# Patient Record
Sex: FEMALE | Race: WHITE | NOT HISPANIC OR LATINO | Employment: FULL TIME | URBAN - METROPOLITAN AREA
[De-identification: names, ages, dates, MRNs, and addresses within clinical notes are randomized per-mention and may not be internally consistent; named-entity substitution may affect disease eponyms.]

---

## 2017-02-14 ENCOUNTER — ALLSCRIPTS OFFICE VISIT (OUTPATIENT)
Dept: OTHER | Facility: OTHER | Age: 32
End: 2017-02-14

## 2017-02-14 ENCOUNTER — TRANSCRIBE ORDERS (OUTPATIENT)
Dept: ADMINISTRATIVE | Facility: HOSPITAL | Age: 32
End: 2017-02-14

## 2017-02-14 DIAGNOSIS — N64.4 MASTODYNIA: ICD-10-CM

## 2017-02-14 DIAGNOSIS — N64.4 MASTODYNIA: Primary | ICD-10-CM

## 2017-02-16 ENCOUNTER — TRANSCRIBE ORDERS (OUTPATIENT)
Dept: ADMINISTRATIVE | Facility: HOSPITAL | Age: 32
End: 2017-02-16

## 2017-02-16 ENCOUNTER — HOSPITAL ENCOUNTER (OUTPATIENT)
Dept: MAMMOGRAPHY | Facility: CLINIC | Age: 32
Discharge: HOME | End: 2017-02-16
Payer: COMMERCIAL

## 2017-02-16 ENCOUNTER — GENERIC CONVERSION - ENCOUNTER (OUTPATIENT)
Dept: OTHER | Facility: OTHER | Age: 32
End: 2017-02-16

## 2017-02-16 ENCOUNTER — HOSPITAL ENCOUNTER (OUTPATIENT)
Dept: ULTRASOUND IMAGING | Facility: CLINIC | Age: 32
Discharge: HOME | End: 2017-02-16
Payer: COMMERCIAL

## 2017-02-16 DIAGNOSIS — N64.4 MASTODYNIA: ICD-10-CM

## 2017-02-16 DIAGNOSIS — Z09 FOLLOW-UP EXAM, 3-6 MONTHS SINCE PREVIOUS EXAM: Primary | ICD-10-CM

## 2017-02-16 PROCEDURE — G0206 DX MAMMO INCL CAD UNI: HCPCS

## 2017-02-16 PROCEDURE — 76642 ULTRASOUND BREAST LIMITED: CPT

## 2017-03-01 ENCOUNTER — ALLSCRIPTS OFFICE VISIT (OUTPATIENT)
Dept: OTHER | Facility: OTHER | Age: 32
End: 2017-03-01

## 2017-07-17 ENCOUNTER — ALLSCRIPTS OFFICE VISIT (OUTPATIENT)
Dept: OTHER | Facility: OTHER | Age: 32
End: 2017-07-17

## 2017-08-16 DIAGNOSIS — N64.4 MASTODYNIA: ICD-10-CM

## 2018-01-12 VITALS
DIASTOLIC BLOOD PRESSURE: 78 MMHG | HEART RATE: 88 BPM | TEMPERATURE: 98.4 F | OXYGEN SATURATION: 98 % | WEIGHT: 167.38 LBS | BODY MASS INDEX: 28.57 KG/M2 | SYSTOLIC BLOOD PRESSURE: 124 MMHG | RESPIRATION RATE: 14 BRPM | HEIGHT: 64 IN

## 2018-01-13 VITALS
WEIGHT: 165.38 LBS | BODY MASS INDEX: 28.24 KG/M2 | HEIGHT: 64 IN | DIASTOLIC BLOOD PRESSURE: 66 MMHG | SYSTOLIC BLOOD PRESSURE: 120 MMHG

## 2018-01-13 VITALS
HEART RATE: 105 BPM | SYSTOLIC BLOOD PRESSURE: 122 MMHG | DIASTOLIC BLOOD PRESSURE: 84 MMHG | HEIGHT: 64 IN | BODY MASS INDEX: 28.92 KG/M2 | TEMPERATURE: 98.7 F | OXYGEN SATURATION: 98 % | WEIGHT: 169.38 LBS | RESPIRATION RATE: 15 BRPM

## 2018-01-13 NOTE — RESULT NOTES
Message   Left breast mammogram and us shows benign findings and radiology rec  Follow-up diagnostic mammogram of the left breast in 6 months  No specific findings to account for breast pain  Small left    breast density felt to most likely be summation shadow without    ultrasound correlate  Six-month follow-up mammogram suggested to    ensure stability as this is a baseline exam       Rec  f-up with breast specialist at [place] [address] as directed for opinion re the mammogram and US findings and clinical management  She should get a 6 month f-up mammogram also as directed by radiology to ensure stablity  make sure she f-up with breast care specialist at [place] [address] as directed for opinion re hx of left breast pain, also 6 month f-up mammogram as per Radiology to ensure stability  Overall probably benign findings on mammogram and US left breast      Verified Results  *US BREAST LEFT LIMITED (DIAGNOSTIC) [id number] 08:11AM [facility]   [facility] Order Number: [id number]    - Patient Instructions: To schedule this appointment, please contact Central Scheduling at [phone]  Test Name Result Flag Reference   US BREAST LEFT LIMITED (Report)     Patient History:   No known family history of cancer  Patient has never smoked  Patient's BMI is 28 3  Reason for exam: clinical finding  Mammo Diagnostic Left W CAD: February 16, 2017 - Check In #:    [de-identified]   CC, MLO, and ML view(s) were taken of the left breast      Technologist: JUNITO Zelaya (R)(M)   There are scattered fibroglandular densities  There are no    dominant or spiculated masses seen within the breast  There are    a few benign-appearing calcifications in the posterior inner left   breast  There are no malignant appearing microcalcification    clusters  In the posterior slightly outer breast on the cc view    there is a small focal density which is felt to most likely be a    summation density   No definitive correlate identified on the MLO   or lateral images  There is no architectural distortion    identified  The skin and nipple profile are normal  There is no   axillary adenopathy  An ultrasound of the left breast was performed  Images were    obtained from 1:00 to 5:00 locations from the nipple to the    periphery  No solid or cystic mass lesions were seen  No    abnormal shadowing tissue identified  No skin thickening  US Breast Left Limited: February 16, 2017 - Check In #: [de-identified]   Standard views  Technologist: Mauro Bacon RDMS   A uniformly echogenic layer of fibroglandular tissue  See above      See above     ACR BI-RADS® Assessments: [facility]:3 - Probably Benign (Overall)   Left breast Left Diag Mammo: [facility]:3 - probably benign finding in   the left breast    Left breast Left Brst US: Left breast is [facility]:1 - negative  Recommendation:   Clinical management of the left breast    Follow-up diagnostic mammogram of the left breast in 6 months  No specific findings to account for breast pain  Small left    breast density felt to most likely be summation shadow without    ultrasound correlate  Six-month follow-up mammogram suggested to   ensure stability as this is a baseline exam    Analyzed by CAD     Transcription Location: [address]   Signing Station: [id number]     Risk Value(s):   Tyrer-Cuzick 10 Year: 0 700%, Tyrer-Cuzick Lifetime: 14 800%,    Myriad Table: 1 5%   Signed by:   Jayashree Carrero MD   2/16/17     MAMMO DIAGNOSTIC LEFT W CAD [id number] 08:10AM [facility] Order Number: [id number]    - Patient Instructions: To schedule this appointment, please contact Central Scheduling at [phone]  Do not wear any perfume, powder, lotion or deodorant on breast or underarm area  Please bring your doctors order, referral (if needed) and insurance information with you on the day of the test  Failure to bring this information may result in this test being rescheduled   Arrive 15 minutes prior to your appointment time to register  On the day of your test, please bring any prior mammogram or breast studies with you that were not performed at a [facility]  Failure to bring prior exams may result in your test needing to be rescheduled  Test Name Result Flag Reference   MAMMO DIAGNOSTIC LEFT W CAD (Report)     Patient History:   No known family history of cancer  Patient has never smoked  Patient's BMI is 28 3  Reason for exam: clinical finding  Mammo Diagnostic Left W CAD: February 16, 2017 - Check In #:    [de-identified]   CC, MLO, and ML view(s) were taken of the left breast      Technologist: JUNITO Alexander (R)(M)   There are scattered fibroglandular densities  There are no    dominant or spiculated masses seen within the breast  There are    a few benign-appearing calcifications in the posterior inner left   breast  There are no malignant appearing microcalcification    clusters  In the posterior slightly outer breast on the cc view    there is a small focal density which is felt to most likely be a    summation density  No definitive correlate identified on the MLO   or lateral images  There is no architectural distortion    identified  The skin and nipple profile are normal  There is no   axillary adenopathy  An ultrasound of the left breast was performed  Images were    obtained from 1:00 to 5:00 locations from the nipple to the    periphery  No solid or cystic mass lesions were seen  No    abnormal shadowing tissue identified  No skin thickening  US Breast Left Limited: February 16, 2017 - Check In #: [de-identified]   Standard views  Technologist: Tracy Balderas RDMS   A uniformly echogenic layer of fibroglandular tissue  See above      See above     ACR BI-RADS® Assessments: [facility]:3 - Probably Benign (Overall)   Left breast Left Diag Mammo: Bi[facility]:3 - probably benign finding in   the left breast    Left breast Left Brst US: Left breast is BiRad:1 - negative       Recommendation: Clinical management of the left breast    Follow-up diagnostic mammogram of the left breast in 6 months  No specific findings to account for breast pain  Small left    breast density felt to most likely be summation shadow without    ultrasound correlate   Six-month follow-up mammogram suggested to   ensure stability as this is a baseline exam    Analyzed by CAD     Transcription Location: [address]   Signing Station: [id number]     Risk Value(s):   Jack-Deng 10 Year: 0 700%, Tyrer-Cugregoria Lifetime: 14 800%,    Myriad Table: 1 5%   Signed by:   Simone Brownlee MD   2/16/17

## 2021-04-08 DIAGNOSIS — Z23 ENCOUNTER FOR IMMUNIZATION: ICD-10-CM

## 2022-07-27 ENCOUNTER — TELEPHONE (OUTPATIENT)
Dept: FAMILY MEDICINE CLINIC | Facility: CLINIC | Age: 37
End: 2022-07-27

## 2022-08-01 NOTE — TELEPHONE ENCOUNTER
08/01/22 2:46 PM     Thank you for your request  Your request has been received, reviewed, and the patient chart updated  The PCP has successfully been removed with a patient attribution note  This message will now be completed      Thank you  [facility]